# Patient Record
Sex: FEMALE | Race: BLACK OR AFRICAN AMERICAN | NOT HISPANIC OR LATINO | Employment: FULL TIME | URBAN - METROPOLITAN AREA
[De-identification: names, ages, dates, MRNs, and addresses within clinical notes are randomized per-mention and may not be internally consistent; named-entity substitution may affect disease eponyms.]

---

## 2018-01-05 ENCOUNTER — GENERIC CONVERSION - ENCOUNTER (OUTPATIENT)
Dept: OTHER | Facility: OTHER | Age: 23
End: 2018-01-05

## 2018-01-12 ENCOUNTER — ALLSCRIPTS OFFICE VISIT (OUTPATIENT)
Dept: OTHER | Facility: OTHER | Age: 23
End: 2018-01-12

## 2018-01-12 ENCOUNTER — GENERIC CONVERSION - ENCOUNTER (OUTPATIENT)
Dept: OTHER | Facility: OTHER | Age: 23
End: 2018-01-12

## 2018-01-12 LAB — HCG, QUALITATIVE (HISTORICAL): NEGATIVE

## 2018-01-23 VITALS
HEIGHT: 64 IN | SYSTOLIC BLOOD PRESSURE: 112 MMHG | BODY MASS INDEX: 21.19 KG/M2 | DIASTOLIC BLOOD PRESSURE: 68 MMHG | WEIGHT: 124.13 LBS

## 2018-01-24 ENCOUNTER — TELEPHONE (OUTPATIENT)
Dept: OTHER | Facility: OTHER | Age: 23
End: 2018-01-24

## 2018-01-24 VITALS
HEIGHT: 64 IN | SYSTOLIC BLOOD PRESSURE: 110 MMHG | DIASTOLIC BLOOD PRESSURE: 60 MMHG | BODY MASS INDEX: 21.72 KG/M2 | WEIGHT: 127.25 LBS

## 2018-01-24 NOTE — TELEPHONE ENCOUNTER
Patient called c/o spotting and bleeding after her first Depo Provera injection on 1/12/18  Advised can be a normal side effect of first injection  Can try Ibuprofen  Observe for further bleeding  May stop with next injection  Patient will call back if problem

## 2018-02-26 NOTE — MISCELLANEOUS
Message   Recorded as Task   Date: 01/11/2018 11:35 AM, Created By: Nanette Mariano   Task Name: Review Document   Assigned To: Vance Farmer   Regarding Patient: Jimmie Avendaño, Status: Active   Comment:    Nanette Mariano - 11 Jan 2018 11:35 AM     TASK CREATED  nml chlamydia and gonorrhea   Sagrario Powell - 12 Jan 2018 8:24 AM     TASK EDITED  LM on Vm of normal results        Active Problems    1  's permit PE (physical examination) (V70 3) (Z02 4)   2  Encounter for initial prescription of other contraceptives (V25 02) (Z30 018)   3  History of self breast exam   4  Vaginal discharge (623 5) (N89 8)   5  Visit for routine gyn exam (V72 31) (Z01 419)    Current Meds   1  MedroxyPROGESTERone Acetate 150 MG/ML Intramuscular Suspension Prefilled   Syringe; to be given in office; Therapy: [id number] to (Last Rx:08Jan2018)  Requested for: [id number] Ordered    Allergies    1  No Known Drug Allergies    2  No Known Food Allergies   3   Pollen    Signatures   Electronically signed by : Irasema Ivey, ; Jan 12 2018  8:24AM EST                       (Author)

## 2018-04-04 ENCOUNTER — TELEPHONE (OUTPATIENT)
Dept: OBGYN CLINIC | Facility: CLINIC | Age: 23
End: 2018-04-04

## 2018-04-06 NOTE — TELEPHONE ENCOUNTER
She can switch to microgestin fe 1/20, around the time she would be due for her next depo provera and then return in 3 months for pill check

## 2018-04-11 DIAGNOSIS — Z30.011 ENCOUNTER FOR INITIAL PRESCRIPTION OF CONTRACEPTIVE PILLS: Primary | ICD-10-CM

## 2018-04-12 RX ORDER — NORETHINDRONE ACETATE AND ETHINYL ESTRADIOL 1MG-20(21)
1 KIT ORAL DAILY
Qty: 28 TABLET | Refills: 2 | Status: SHIPPED | OUTPATIENT
Start: 2018-04-12 | End: 2018-07-09 | Stop reason: SDUPTHER

## 2018-07-05 DIAGNOSIS — Z30.011 ENCOUNTER FOR INITIAL PRESCRIPTION OF CONTRACEPTIVE PILLS: ICD-10-CM

## 2018-07-05 RX ORDER — NORETHINDRONE ACETATE AND ETHINYL ESTRADIOL AND FERROUS FUMARATE 1MG-20(21)
KIT ORAL
Qty: 28 TABLET | Refills: 2 | Status: CANCELLED | OUTPATIENT
Start: 2018-07-05

## 2018-07-09 DIAGNOSIS — Z30.011 ENCOUNTER FOR INITIAL PRESCRIPTION OF CONTRACEPTIVE PILLS: ICD-10-CM

## 2018-07-10 RX ORDER — NORETHINDRONE ACETATE AND ETHINYL ESTRADIOL 1MG-20(21)
1 KIT ORAL DAILY
Qty: 84 TABLET | Refills: 1 | Status: SHIPPED | OUTPATIENT
Start: 2018-07-10 | End: 2018-09-04 | Stop reason: SDUPTHER

## 2018-09-04 ENCOUNTER — OFFICE VISIT (OUTPATIENT)
Dept: OBGYN CLINIC | Facility: CLINIC | Age: 23
End: 2018-09-04
Payer: COMMERCIAL

## 2018-09-04 VITALS
HEIGHT: 64 IN | BODY MASS INDEX: 19.63 KG/M2 | WEIGHT: 115 LBS | DIASTOLIC BLOOD PRESSURE: 72 MMHG | SYSTOLIC BLOOD PRESSURE: 120 MMHG

## 2018-09-04 DIAGNOSIS — Z32.00 POSSIBLE PREGNANCY: ICD-10-CM

## 2018-09-04 DIAGNOSIS — R30.0 DYSURIA: Primary | ICD-10-CM

## 2018-09-04 DIAGNOSIS — N92.6 IRREGULAR MENSES: ICD-10-CM

## 2018-09-04 DIAGNOSIS — Z30.011 ENCOUNTER FOR INITIAL PRESCRIPTION OF CONTRACEPTIVE PILLS: ICD-10-CM

## 2018-09-04 LAB — SL AMB POCT URINE HCG: NEGATIVE

## 2018-09-04 PROCEDURE — 99213 OFFICE O/P EST LOW 20 MIN: CPT | Performed by: OBSTETRICS & GYNECOLOGY

## 2018-09-04 PROCEDURE — 81001 URINALYSIS AUTO W/SCOPE: CPT | Performed by: OBSTETRICS & GYNECOLOGY

## 2018-09-04 PROCEDURE — 81025 URINE PREGNANCY TEST: CPT | Performed by: OBSTETRICS & GYNECOLOGY

## 2018-09-04 PROCEDURE — 87086 URINE CULTURE/COLONY COUNT: CPT | Performed by: OBSTETRICS & GYNECOLOGY

## 2018-09-04 RX ORDER — NICOTINE POLACRILEX 2 MG
GUM BUCCAL
COMMUNITY

## 2018-09-04 RX ORDER — MEDROXYPROGESTERONE ACETATE 150 MG/ML
INJECTION, SUSPENSION INTRAMUSCULAR
COMMUNITY
Start: 2018-01-05 | End: 2018-09-04 | Stop reason: ALTCHOICE

## 2018-09-04 RX ORDER — NORETHINDRONE ACETATE AND ETHINYL ESTRADIOL 1MG-20(21)
1 KIT ORAL DAILY
Qty: 84 TABLET | Refills: 1 | Status: SHIPPED | OUTPATIENT
Start: 2018-09-04 | End: 2018-11-27

## 2018-09-04 NOTE — PROGRESS NOTES
Assessment/Plan:     Contraception-we reviewed correct pill taking and she does seem to be compliant although she did take a pill late last week when she had the breakthrough bleeding  I strongly urged her to continue with consistent condom use  Pregnancy test done in the office today was negative  She will continue the Microgestin and prescription was renewed for her  She is due to return in January or February for yearly  If she continues to have breakthrough bleeding, she will call or we can discuss in January and then I would recommend a change in pills  She will be due for a Pap and we can do chlamydia and gonorrhea testing at that time as well  Dysuria-urinalysis with reflex culture sent   Diagnoses and all orders for this visit:    Dysuria  -     UA w Reflex to Microscopic w Reflex to Culture  -     Urine culture    Encounter for initial prescription of contraceptive pills  -     norethindrone-ethinyl estradiol ([facility] FE 1/20) 1-20 MG-MCG per tablet; Take 1 tablet by mouth daily for 84 days    Possible pregnancy  -     POCT urine HCG    Other orders  -     Biotin 1 MG CAPS; Take by mouth  -     Discontinue: medroxyPROGESTERone acetate (DEPO-PROVERA SYRINGE) 150 mg/mL injection; Inject into a muscle          Subjective:     Patient ID: Sadie Minor is a 23 y o  female  Patient here for a pill check  Initially, she was started on Depo-Provera but she was having breakthrough bleeding and felt that it was affecting her mood  She was then switched over to Microgestin  She has been feeling well on this and is happy with it  She is sexually active and uses condoms  She has been having her withdrawal bleed in the 1st week of her new pack instead of on the placebo week  Her bleeding lasts for 4-5 days and is of an average flow  Last week, she had 3 days of breakthrough spotting about a week after her normal bleeding   Her weight and blood pressure are good and she has no other side effects or concerns about these pills  She would like to continue with them  Also, she feels that she might have a urinary tract infection as she has some dysuria  She denies blood  Review of Systems   Constitutional: Negative  HENT: Negative  Eyes: Negative  Respiratory: Negative  Cardiovascular: Negative  Gastrointestinal: Negative  Endocrine: Negative  Genitourinary: Negative  Musculoskeletal: Negative  Skin: Negative  Allergic/Immunologic: Negative  Neurological: Negative  Hematological: Negative  Psychiatric/Behavioral: Negative            Objective:     Physical Exam  deferred

## 2018-09-05 LAB
BACTERIA UR QL AUTO: ABNORMAL /HPF
BILIRUB UR QL STRIP: NEGATIVE
CLARITY UR: ABNORMAL
COLOR UR: ABNORMAL
GLUCOSE UR STRIP-MCNC: NEGATIVE MG/DL
HGB UR QL STRIP.AUTO: ABNORMAL
HYALINE CASTS #/AREA URNS LPF: ABNORMAL /LPF
KETONES UR STRIP-MCNC: NEGATIVE MG/DL
LEUKOCYTE ESTERASE UR QL STRIP: ABNORMAL
NITRITE UR QL STRIP: NEGATIVE
NON-SQ EPI CELLS URNS QL MICRO: ABNORMAL /HPF
PH UR STRIP.AUTO: 7 [PH] (ref 4.5–8)
PROT UR STRIP-MCNC: ABNORMAL MG/DL
RBC #/AREA URNS AUTO: ABNORMAL /HPF
SP GR UR STRIP.AUTO: 1.02 (ref 1–1.03)
UROBILINOGEN UR QL STRIP.AUTO: 1 E.U./DL
WBC #/AREA URNS AUTO: ABNORMAL /HPF

## 2018-09-06 ENCOUNTER — TELEPHONE (OUTPATIENT)
Dept: OBGYN CLINIC | Facility: CLINIC | Age: 23
End: 2018-09-06

## 2018-09-06 LAB — BACTERIA UR CULT: NORMAL

## 2018-09-06 NOTE — TELEPHONE ENCOUNTER
----- Message from Diane Duran DO sent at 9/6/2018 12:01 PM EDT -----  Pt had blood and WBCs in her urine but no infection  She was bleeding when this was done  Is she still having symptoms? If so, could treat

## 2018-09-06 NOTE — TELEPHONE ENCOUNTER
----- Message from Caroline Pollard DO sent at 9/6/2018 12:01 PM EDT -----  Pt had blood and WBCs in her urine but no infection  She was bleeding when this was done  Is she still having symptoms? If so, could treat

## 2018-09-06 NOTE — TELEPHONE ENCOUNTER
----- Message from Isac Fields DO sent at 9/6/2018 12:01 PM EDT -----  Pt had blood and WBCs in her urine but no infection  She was bleeding when this was done  Is she still having symptoms? If so, could treat